# Patient Record
Sex: MALE | Race: WHITE | NOT HISPANIC OR LATINO | URBAN - METROPOLITAN AREA
[De-identification: names, ages, dates, MRNs, and addresses within clinical notes are randomized per-mention and may not be internally consistent; named-entity substitution may affect disease eponyms.]

---

## 2017-10-25 NOTE — PATIENT DISCUSSION
"Amblyopia Counseling:  Amblyopia is poor vision in one eye, often called ?lazy eye.""  It is caused by any condition that affects normal use of the eyes and visual development. The development of equal vision in both eyes is necessary for normal binocular vision. Treatment of amblyopia often involves covering or blurring the vision in the better seeing eye to allow the brain to develop the weak eye. Polycarbonated lenses are recommended in the patient's glass prescription and eye protection is recommended to be worn at all times. "

## 2017-10-25 NOTE — PATIENT DISCUSSION
AMBLYOPIA WITH STABLE VISION AND ALIGNMENT. RECOMMEND PATIENT USE POLYCARBONATED LENSES AND UPDATE SPEC RX WITH FULL TIME WEAR.

## 2018-11-30 NOTE — PATIENT DISCUSSION
AMBLYOPIA OD WITH STABLE VISION AND ALIGNMENT. RECOMMEND PATIENT USE POLYCARBONATED LENSES AND UPDATE SPEC RX WITH FULL TIME WEAR.

## 2021-03-10 NOTE — PATIENT DISCUSSION
ACCOMMODATIVE ESOTROPIA WITH STABLE VISION AND ALIGNMENT. RECOMMEND TO UPDATE AND USE READING GLASSES. FOLLOW AS SCHEDULED.

## 2022-07-14 ENCOUNTER — ESTABLISHED PATIENT (OUTPATIENT)
Dept: URBAN - METROPOLITAN AREA CLINIC 21 | Facility: CLINIC | Age: 72
End: 2022-07-14

## 2022-07-14 DIAGNOSIS — H25.813: ICD-10-CM

## 2022-07-14 DIAGNOSIS — H35.371: ICD-10-CM

## 2022-07-14 PROCEDURE — 92250 FUNDUS PHOTOGRAPHY W/I&R: CPT

## 2022-07-14 PROCEDURE — 66999HDA HD ANALYZER

## 2022-07-14 PROCEDURE — 92014 COMPRE OPH EXAM EST PT 1/>: CPT

## 2022-07-14 ASSESSMENT — TONOMETRY
OD_IOP_MMHG: 9
OS_IOP_MMHG: 8

## 2022-07-14 ASSESSMENT — VISUAL ACUITY
OS_CC: 20/25
OU_CC: 20/25
OD_CC: 20/40
OU_CC: J1

## 2022-07-14 ASSESSMENT — KERATOMETRY
OS_AXISANGLE_DEGREES: 11
OD_K2POWER_DIOPTERS: 44.75
OS_AXISANGLE2_DEGREES: 101
OS_K1POWER_DIOPTERS: 43.50
OD_AXISANGLE_DEGREES: 22
OD_K1POWER_DIOPTERS: 44.00
OD_AXISANGLE2_DEGREES: 112
OS_K2POWER_DIOPTERS: 44.00

## 2023-08-08 ENCOUNTER — ESTABLISHED PATIENT (OUTPATIENT)
Dept: URBAN - METROPOLITAN AREA CLINIC 21 | Facility: CLINIC | Age: 73
End: 2023-08-08

## 2023-08-08 DIAGNOSIS — H25.813: ICD-10-CM

## 2023-08-08 DIAGNOSIS — H35.371: ICD-10-CM

## 2023-08-08 PROCEDURE — 92014 COMPRE OPH EXAM EST PT 1/>: CPT

## 2023-08-08 PROCEDURE — 92134 CPTRZ OPH DX IMG PST SGM RTA: CPT

## 2023-08-08 ASSESSMENT — TONOMETRY
OS_IOP_MMHG: 10
OD_IOP_MMHG: 11

## 2023-08-08 ASSESSMENT — VISUAL ACUITY
OS_SC: 20/40
OS_CC: 20/30-1
OD_CC: 20/25-1
OD_SC: 20/40

## 2024-04-16 ENCOUNTER — ESTABLISHED PATIENT (OUTPATIENT)
Dept: URBAN - METROPOLITAN AREA CLINIC 21 | Facility: CLINIC | Age: 74
End: 2024-04-16

## 2024-04-16 DIAGNOSIS — H16.223: ICD-10-CM

## 2024-04-16 DIAGNOSIS — H25.813: ICD-10-CM

## 2024-04-16 DIAGNOSIS — H35.371: ICD-10-CM

## 2024-04-16 PROCEDURE — 92134 CPTRZ OPH DX IMG PST SGM RTA: CPT

## 2024-04-16 PROCEDURE — 92014 COMPRE OPH EXAM EST PT 1/>: CPT

## 2024-04-16 ASSESSMENT — KERATOMETRY
OS_K1POWER_DIOPTERS: 43.50
OS_AXISANGLE_DEGREES: 0
OD_AXISANGLE_DEGREES: 51
OS_AXISANGLE2_DEGREES: 90
OS_K2POWER_DIOPTERS: 43.50
OD_AXISANGLE2_DEGREES: 141
OD_K1POWER_DIOPTERS: 44.00
OD_K2POWER_DIOPTERS: 44.25

## 2024-04-16 ASSESSMENT — TONOMETRY
OD_IOP_MMHG: 8
OS_IOP_MMHG: 8

## 2024-04-16 ASSESSMENT — VISUAL ACUITY
OU_SC: J10
OD_SC: 20/40
OS_SC: 20/40
OU_SC: 20/30-3

## 2025-01-21 ENCOUNTER — FOLLOW UP (OUTPATIENT)
Age: 75
End: 2025-01-21

## 2025-01-21 DIAGNOSIS — H35.371: ICD-10-CM

## 2025-01-21 DIAGNOSIS — H02.88B: ICD-10-CM

## 2025-01-21 DIAGNOSIS — H02.88A: ICD-10-CM

## 2025-01-21 DIAGNOSIS — H16.223: ICD-10-CM

## 2025-01-21 DIAGNOSIS — H25.813: ICD-10-CM

## 2025-01-21 DIAGNOSIS — D31.31: ICD-10-CM

## 2025-01-21 DIAGNOSIS — H43.393: ICD-10-CM

## 2025-01-21 PROCEDURE — 92014 COMPRE OPH EXAM EST PT 1/>: CPT

## 2025-01-28 ENCOUNTER — CONSULTATION/EVALUATION (OUTPATIENT)
Age: 75
End: 2025-01-28

## 2025-01-28 DIAGNOSIS — H16.223: ICD-10-CM

## 2025-01-28 DIAGNOSIS — H02.88A: ICD-10-CM

## 2025-01-28 DIAGNOSIS — H43.393: ICD-10-CM

## 2025-01-28 DIAGNOSIS — H25.813: ICD-10-CM

## 2025-01-28 DIAGNOSIS — H02.88B: ICD-10-CM

## 2025-01-28 DIAGNOSIS — H35.363: ICD-10-CM

## 2025-01-28 DIAGNOSIS — D31.31: ICD-10-CM

## 2025-01-28 DIAGNOSIS — H35.371: ICD-10-CM

## 2025-01-28 PROCEDURE — 92134 CPTRZ OPH DX IMG PST SGM RTA: CPT

## 2025-01-28 PROCEDURE — 92136 OPHTHALMIC BIOMETRY: CPT

## 2025-01-28 PROCEDURE — 92014 COMPRE OPH EXAM EST PT 1/>: CPT

## 2025-02-03 ENCOUNTER — PRE-OP/H&P (OUTPATIENT)
Age: 75
End: 2025-02-03

## 2025-02-03 DIAGNOSIS — H25.813: ICD-10-CM

## 2025-02-03 PROCEDURE — 99211PRE PRE OP VISIT

## 2025-02-12 ENCOUNTER — SURGERY/PROCEDURE (OUTPATIENT)
Age: 75
End: 2025-02-12

## 2025-02-12 DIAGNOSIS — H25.811: ICD-10-CM

## 2025-02-12 PROCEDURE — 66984CV REMOVE CATARACT, INSERT LENS, CUSTOM VISION

## 2025-02-12 PROCEDURE — 99199PCVH PROF CUSTOM VISION PACKAGE, HUNTER

## 2025-02-13 ENCOUNTER — POST-OP (OUTPATIENT)
Age: 75
End: 2025-02-13

## 2025-02-13 DIAGNOSIS — Z96.1: ICD-10-CM

## 2025-02-13 DIAGNOSIS — D31.31: ICD-10-CM

## 2025-02-13 PROCEDURE — 99024 POSTOP FOLLOW-UP VISIT: CPT

## 2025-02-18 ENCOUNTER — POST-OP (OUTPATIENT)
Age: 75
End: 2025-02-18

## 2025-02-18 DIAGNOSIS — Z96.1: ICD-10-CM

## 2025-02-18 DIAGNOSIS — D31.31: ICD-10-CM

## 2025-02-18 DIAGNOSIS — H25.812: ICD-10-CM

## 2025-02-26 ENCOUNTER — SURGERY/PROCEDURE (OUTPATIENT)
Age: 75
End: 2025-02-26

## 2025-02-26 DIAGNOSIS — H25.812: ICD-10-CM

## 2025-02-26 PROCEDURE — 66984CV REMOVE CATARACT, INSERT LENS, CUSTOM VISION: Mod: 79,LT

## 2025-02-26 PROCEDURE — 99199PCVH PROF CUSTOM VISION PACKAGE, HUNTER

## 2025-02-27 ENCOUNTER — POST-OP (OUTPATIENT)
Age: 75
End: 2025-02-27

## 2025-02-27 DIAGNOSIS — Z96.1: ICD-10-CM

## 2025-02-27 PROCEDURE — 99024 POSTOP FOLLOW-UP VISIT: CPT

## 2025-03-11 ENCOUNTER — POST-OP (OUTPATIENT)
Age: 75
End: 2025-03-11

## 2025-03-11 DIAGNOSIS — Z96.1: ICD-10-CM

## 2025-03-11 RX ORDER — TOBRAMYCIN / DEXAMETHASONE 3; .5 MG/ML; MG/ML: 1 SUSPENSION/ DROPS OPHTHALMIC

## 2025-04-22 ENCOUNTER — POST-OP (OUTPATIENT)
Age: 75
End: 2025-04-22

## 2025-04-22 DIAGNOSIS — Z96.1: ICD-10-CM
